# Patient Record
Sex: MALE | Race: WHITE | Employment: FULL TIME | URBAN - METROPOLITAN AREA
[De-identification: names, ages, dates, MRNs, and addresses within clinical notes are randomized per-mention and may not be internally consistent; named-entity substitution may affect disease eponyms.]

---

## 2016-12-13 LAB — CREATININE, EXTERNAL: 1.08

## 2016-12-14 LAB — MICROALBUMIN UR TEST STR-MCNC: 164.5 MG/DL

## 2017-07-13 DIAGNOSIS — E10.65 HYPERGLYCEMIA DUE TO TYPE 1 DIABETES MELLITUS (HCC): Primary | ICD-10-CM

## 2017-07-13 DIAGNOSIS — Z79.4 TYPE 2 DIABETES MELLITUS WITH HYPERGLYCEMIA, WITH LONG-TERM CURRENT USE OF INSULIN (HCC): ICD-10-CM

## 2017-07-13 DIAGNOSIS — E11.65 TYPE 2 DIABETES MELLITUS WITH HYPERGLYCEMIA, WITH LONG-TERM CURRENT USE OF INSULIN (HCC): ICD-10-CM

## 2017-07-13 RX ORDER — INSULIN GLARGINE 100 [IU]/ML
60 INJECTION, SOLUTION SUBCUTANEOUS
Qty: 60 ML | Refills: 3 | Status: ON HOLD | OUTPATIENT
Start: 2017-07-13 | End: 2020-12-17 | Stop reason: SDUPTHER

## 2017-08-09 DIAGNOSIS — E78.2 MIXED HYPERLIPIDEMIA: ICD-10-CM

## 2017-08-09 RX ORDER — LISINOPRIL 10 MG/1
10 TABLET ORAL DAILY
Qty: 90 TAB | Refills: 3 | Status: SHIPPED | OUTPATIENT
Start: 2017-08-09

## 2017-11-08 DIAGNOSIS — E10.65 UNCONTROLLED TYPE 1 DIABETES MELLITUS WITH HYPERGLYCEMIA (HCC): ICD-10-CM

## 2017-11-08 DIAGNOSIS — E78.2 MIXED HYPERLIPIDEMIA: ICD-10-CM

## 2017-11-08 DIAGNOSIS — E10.65 HYPERGLYCEMIA DUE TO TYPE 1 DIABETES MELLITUS (HCC): ICD-10-CM

## 2017-11-08 RX ORDER — FENOFIBRATE 145 MG/1
TABLET, COATED ORAL
Qty: 30 TAB | Refills: 5 | Status: SHIPPED | OUTPATIENT
Start: 2017-11-08 | End: 2017-12-27 | Stop reason: SDUPTHER

## 2017-12-27 DIAGNOSIS — E78.2 MIXED HYPERLIPIDEMIA: ICD-10-CM

## 2017-12-27 DIAGNOSIS — E10.65 UNCONTROLLED TYPE 1 DIABETES MELLITUS WITH HYPERGLYCEMIA (HCC): ICD-10-CM

## 2017-12-27 DIAGNOSIS — E10.65 HYPERGLYCEMIA DUE TO TYPE 1 DIABETES MELLITUS (HCC): ICD-10-CM

## 2017-12-27 RX ORDER — FENOFIBRATE 145 MG/1
TABLET, COATED ORAL
Qty: 90 TAB | Refills: 3 | Status: SHIPPED | OUTPATIENT
Start: 2017-12-27 | End: 2018-07-16 | Stop reason: SDUPTHER

## 2017-12-27 NOTE — TELEPHONE ENCOUNTER
Pharmacy wants doctor to put in 90 day perscription for phenol phibrate  (did not spell it correctly)

## 2018-02-09 DIAGNOSIS — E10.65 HYPERGLYCEMIA DUE TO TYPE 1 DIABETES MELLITUS (HCC): ICD-10-CM

## 2018-02-09 RX ORDER — INSULIN ASPART 100 [IU]/ML
INJECTION, SOLUTION INTRAVENOUS; SUBCUTANEOUS
Qty: 30 ML | Refills: 2 | Status: SHIPPED | OUTPATIENT
Start: 2018-02-09 | End: 2020-12-16

## 2018-06-12 DIAGNOSIS — E10.65 HYPERGLYCEMIA DUE TO TYPE 1 DIABETES MELLITUS (HCC): ICD-10-CM

## 2018-06-12 DIAGNOSIS — E78.2 MIXED HYPERLIPIDEMIA: ICD-10-CM

## 2018-06-12 RX ORDER — BLOOD SUGAR DIAGNOSTIC
STRIP MISCELLANEOUS
Qty: 200 STRIP | Refills: 0 | Status: SHIPPED | OUTPATIENT
Start: 2018-06-12 | End: 2018-07-02 | Stop reason: SDUPTHER

## 2018-07-02 ENCOUNTER — TELEPHONE (OUTPATIENT)
Dept: ENDOCRINOLOGY | Age: 38
End: 2018-07-02

## 2018-07-02 DIAGNOSIS — E10.65 HYPERGLYCEMIA DUE TO TYPE 1 DIABETES MELLITUS (HCC): ICD-10-CM

## 2018-07-02 DIAGNOSIS — E78.2 MIXED HYPERLIPIDEMIA: ICD-10-CM

## 2018-07-02 DIAGNOSIS — E10.65 UNCONTROLLED TYPE 1 DIABETES MELLITUS WITH HYPERGLYCEMIA (HCC): ICD-10-CM

## 2018-07-02 NOTE — TELEPHONE ENCOUNTER
Patient needs a 90day script for one touch strips called to [facility] [phone].  Thank you

## 2018-07-16 DIAGNOSIS — E10.65 UNCONTROLLED TYPE 1 DIABETES MELLITUS WITH HYPERGLYCEMIA (HCC): ICD-10-CM

## 2018-07-16 DIAGNOSIS — E10.65 HYPERGLYCEMIA DUE TO TYPE 1 DIABETES MELLITUS (HCC): ICD-10-CM

## 2018-07-16 DIAGNOSIS — E78.2 MIXED HYPERLIPIDEMIA: ICD-10-CM

## 2018-07-16 RX ORDER — FENOFIBRATE 145 MG/1
TABLET, COATED ORAL
Qty: 90 TAB | Refills: 1 | Status: SHIPPED | OUTPATIENT
Start: 2018-07-16

## 2019-01-18 LAB
CREATININE, EXTERNAL: 1.25
HBA1C MFR BLD HPLC: 12.2 %

## 2019-09-16 LAB
HBA1C MFR BLD HPLC: 10.9 %
PT, EXTERNAL: 0

## 2020-12-16 ENCOUNTER — APPOINTMENT (OUTPATIENT)
Dept: CT IMAGING | Age: 40
End: 2020-12-16
Attending: EMERGENCY MEDICINE
Payer: COMMERCIAL

## 2020-12-16 ENCOUNTER — APPOINTMENT (OUTPATIENT)
Dept: GENERAL RADIOLOGY | Age: 40
End: 2020-12-16
Attending: EMERGENCY MEDICINE
Payer: COMMERCIAL

## 2020-12-16 ENCOUNTER — HOSPITAL ENCOUNTER (OUTPATIENT)
Age: 40
Setting detail: OBSERVATION
Discharge: HOME | End: 2020-12-17
Attending: EMERGENCY MEDICINE | Admitting: INTERNAL MEDICINE
Payer: COMMERCIAL

## 2020-12-16 DIAGNOSIS — R41.82 ALTERED MENTAL STATUS, UNSPECIFIED ALTERED MENTAL STATUS TYPE: Primary | ICD-10-CM

## 2020-12-16 DIAGNOSIS — E16.2 HYPOGLYCEMIA: ICD-10-CM

## 2020-12-16 DIAGNOSIS — E87.6 HYPOKALEMIA: ICD-10-CM

## 2020-12-16 DIAGNOSIS — E10.65 HYPERGLYCEMIA DUE TO TYPE 1 DIABETES MELLITUS (HCC): ICD-10-CM

## 2020-12-16 LAB
ALBUMIN SERPL-MCNC: 4.6 G/DL (ref 3.5–5)
ALBUMIN/GLOB SERPL: 1 {RATIO} (ref 1.1–2.2)
ALP SERPL-CCNC: 100 U/L (ref 45–117)
ALT SERPL-CCNC: 16 U/L (ref 12–78)
AMPHET UR QL SCN: NEGATIVE
ANION GAP SERPL CALC-SCNC: 19 MMOL/L (ref 5–15)
APPEARANCE UR: ABNORMAL
APTT PPP: 22.1 SEC (ref 23–35.7)
AST SERPL W P-5'-P-CCNC: 9 U/L (ref 15–37)
ATRIAL RATE: 117 BPM
ATRIAL RATE: 134 BPM
BACTERIA URNS QL MICRO: NEGATIVE /HPF
BARBITURATES UR QL SCN: NEGATIVE
BASOPHILS # BLD: 0 K/UL (ref 0–0.1)
BASOPHILS NFR BLD: 0 % (ref 0–1)
BENZODIAZ UR QL: NEGATIVE
BILIRUB SERPL-MCNC: 1.1 MG/DL (ref 0.2–1)
BILIRUB UR QL: NEGATIVE
BNP SERPL-MCNC: 25 PG/ML
BUN SERPL-MCNC: 20 MG/DL (ref 6–20)
BUN/CREAT SERPL: 14 (ref 12–20)
CA-I BLD-MCNC: 10.4 MG/DL (ref 8.5–10.1)
CALCULATED P AXIS, ECG09: 40 DEGREES
CALCULATED P AXIS, ECG09: 44 DEGREES
CALCULATED R AXIS, ECG10: 18 DEGREES
CALCULATED R AXIS, ECG10: 20 DEGREES
CALCULATED T AXIS, ECG11: -24 DEGREES
CALCULATED T AXIS, ECG11: 65 DEGREES
CANNABINOIDS UR QL SCN: NEGATIVE
CHLORIDE SERPL-SCNC: 100 MMOL/L (ref 97–108)
CHLORIDE UR-SCNC: 17 MMOL/L
CO2 SERPL-SCNC: 16 MMOL/L (ref 21–32)
COCAINE UR QL SCN: NEGATIVE
COLOR UR: ABNORMAL
CREAT SERPL-MCNC: 1.41 MG/DL (ref 0.7–1.3)
CREAT UR-MCNC: 123 MG/DL
DIAGNOSIS, 93000: NORMAL
DIAGNOSIS, 93000: NORMAL
DIFFERENTIAL METHOD BLD: ABNORMAL
DRUG SCRN COMMENT,DRGCM: NORMAL
EOSINOPHIL # BLD: 0.1 K/UL (ref 0–0.4)
EOSINOPHIL NFR BLD: 1 % (ref 0–7)
ERYTHROCYTE [DISTWIDTH] IN BLOOD BY AUTOMATED COUNT: 12.8 % (ref 11.5–14.5)
ETHANOL SERPL-MCNC: <4 MG/DL
GLOBULIN SER CALC-MCNC: 4.8 G/DL (ref 2–4)
GLUCOSE BLD STRIP.AUTO-MCNC: 114 MG/DL (ref 65–100)
GLUCOSE BLD STRIP.AUTO-MCNC: 146 MG/DL (ref 65–100)
GLUCOSE BLD STRIP.AUTO-MCNC: 169 MG/DL (ref 65–100)
GLUCOSE BLD STRIP.AUTO-MCNC: 189 MG/DL (ref 65–100)
GLUCOSE BLD STRIP.AUTO-MCNC: 302 MG/DL (ref 65–100)
GLUCOSE BLD STRIP.AUTO-MCNC: 360 MG/DL (ref 65–100)
GLUCOSE BLD STRIP.AUTO-MCNC: 57 MG/DL (ref 65–100)
GLUCOSE BLD STRIP.AUTO-MCNC: 64 MG/DL (ref 65–100)
GLUCOSE BLD STRIP.AUTO-MCNC: 74 MG/DL (ref 65–100)
GLUCOSE BLD STRIP.AUTO-MCNC: 76 MG/DL (ref 65–100)
GLUCOSE BLD STRIP.AUTO-MCNC: 90 MG/DL (ref 65–100)
GLUCOSE BLD STRIP.AUTO-MCNC: 92 MG/DL (ref 65–100)
GLUCOSE BLD STRIP.AUTO-MCNC: 97 MG/DL (ref 65–100)
GLUCOSE SERPL-MCNC: 56 MG/DL (ref 65–100)
GLUCOSE UR STRIP.AUTO-MCNC: >300 MG/DL
HCT VFR BLD AUTO: 48.7 % (ref 36.6–50.3)
HGB BLD-MCNC: 17.6 G/DL (ref 12.1–17)
HGB UR QL STRIP: NEGATIVE
HYALINE CASTS URNS QL MICRO: ABNORMAL /LPF (ref 0–5)
IMM GRANULOCYTES # BLD AUTO: 0 K/UL (ref 0–0.04)
IMM GRANULOCYTES NFR BLD AUTO: 0 % (ref 0–0.5)
INR PPP: 0.9 (ref 0.9–1.1)
KETONES UR QL STRIP.AUTO: 80 MG/DL
LACTATE SERPL-SCNC: 2.3 MMOL/L (ref 0.4–2)
LEUKOCYTE ESTERASE UR QL STRIP.AUTO: NEGATIVE
LYMPHOCYTES # BLD: 2.2 K/UL (ref 0.8–3.5)
LYMPHOCYTES NFR BLD: 20 % (ref 12–49)
MCH RBC QN AUTO: 33 PG (ref 26–34)
MCHC RBC AUTO-ENTMCNC: 36.1 G/DL (ref 30–36.5)
MCV RBC AUTO: 91.4 FL (ref 80–99)
METHADONE UR QL: NEGATIVE
MONOCYTES # BLD: 0.4 K/UL (ref 0–1)
MONOCYTES NFR BLD: 4 % (ref 5–13)
MUCOUS THREADS URNS QL MICRO: ABNORMAL /LPF
NEUTS SEG # BLD: 8.1 K/UL (ref 1.8–8)
NEUTS SEG NFR BLD: 75 % (ref 32–75)
NITRITE UR QL STRIP.AUTO: NEGATIVE
OPIATES UR QL: NEGATIVE
P-R INTERVAL, ECG05: 118 MS
P-R INTERVAL, ECG05: 138 MS
PCP UR QL: NEGATIVE
PERFORMED BY, TECHID: ABNORMAL
PERFORMED BY, TECHID: NORMAL
PH UR STRIP: 5 [PH] (ref 5–8)
PLATELET # BLD AUTO: 354 K/UL (ref 150–400)
PMV BLD AUTO: 10.5 FL (ref 8.9–12.9)
POTASSIUM SERPL-SCNC: 2.6 MMOL/L (ref 3.5–5.1)
PROT SERPL-MCNC: 9.4 G/DL (ref 6.4–8.2)
PROT UR STRIP-MCNC: 100 MG/DL
PROTHROMBIN TIME: 12.3 SEC (ref 11.9–14.7)
Q-T INTERVAL, ECG07: 344 MS
Q-T INTERVAL, ECG07: 384 MS
QRS DURATION, ECG06: 96 MS
QRS DURATION, ECG06: 96 MS
QTC CALCULATION (BEZET), ECG08: 513 MS
QTC CALCULATION (BEZET), ECG08: 535 MS
RBC # BLD AUTO: 5.33 M/UL (ref 4.1–5.7)
RBC #/AREA URNS HPF: ABNORMAL /HPF (ref 0–5)
SODIUM SERPL-SCNC: 135 MMOL/L (ref 136–145)
SODIUM UR-SCNC: 23 MMOL/L
SP GR UR REFRACTOMETRY: 1.03 (ref 1–1.03)
THERAPEUTIC RANGE,PTTT: ABNORMAL SEC (ref 68–109)
TROPONIN I SERPL-MCNC: <0.05 NG/ML
UA: UC IF INDICATED,UAUC: ABNORMAL
UROBILINOGEN UR QL STRIP.AUTO: 0.1 EU/DL (ref 0.1–1)
VENTRICULAR RATE, ECG03: 117 BPM
VENTRICULAR RATE, ECG03: 134 BPM
WBC # BLD AUTO: 10.7 K/UL (ref 4.1–11.1)
WBC URNS QL MICRO: ABNORMAL /HPF (ref 0–4)

## 2020-12-16 PROCEDURE — 82436 ASSAY OF URINE CHLORIDE: CPT

## 2020-12-16 PROCEDURE — 96376 TX/PRO/DX INJ SAME DRUG ADON: CPT

## 2020-12-16 PROCEDURE — 74011000636 HC RX REV CODE- 636: Performed by: EMERGENCY MEDICINE

## 2020-12-16 PROCEDURE — 83036 HEMOGLOBIN GLYCOSYLATED A1C: CPT

## 2020-12-16 PROCEDURE — 85025 COMPLETE CBC W/AUTO DIFF WBC: CPT

## 2020-12-16 PROCEDURE — 36415 COLL VENOUS BLD VENIPUNCTURE: CPT

## 2020-12-16 PROCEDURE — 96367 TX/PROPH/DG ADDL SEQ IV INF: CPT

## 2020-12-16 PROCEDURE — 99218 HC RM OBSERVATION: CPT

## 2020-12-16 PROCEDURE — 74011250637 HC RX REV CODE- 250/637: Performed by: INTERNAL MEDICINE

## 2020-12-16 PROCEDURE — 96366 THER/PROPH/DIAG IV INF ADDON: CPT

## 2020-12-16 PROCEDURE — 84300 ASSAY OF URINE SODIUM: CPT

## 2020-12-16 PROCEDURE — 82962 GLUCOSE BLOOD TEST: CPT

## 2020-12-16 PROCEDURE — 71045 X-RAY EXAM CHEST 1 VIEW: CPT

## 2020-12-16 PROCEDURE — 93005 ELECTROCARDIOGRAM TRACING: CPT

## 2020-12-16 PROCEDURE — 99285 EMERGENCY DEPT VISIT HI MDM: CPT

## 2020-12-16 PROCEDURE — 74011000258 HC RX REV CODE- 258: Performed by: EMERGENCY MEDICINE

## 2020-12-16 PROCEDURE — 80053 COMPREHEN METABOLIC PANEL: CPT

## 2020-12-16 PROCEDURE — 83605 ASSAY OF LACTIC ACID: CPT

## 2020-12-16 PROCEDURE — 96365 THER/PROPH/DIAG IV INF INIT: CPT

## 2020-12-16 PROCEDURE — 81001 URINALYSIS AUTO W/SCOPE: CPT

## 2020-12-16 PROCEDURE — 74174 CTA ABD&PLVS W/CONTRAST: CPT

## 2020-12-16 PROCEDURE — 70450 CT HEAD/BRAIN W/O DYE: CPT

## 2020-12-16 PROCEDURE — 80307 DRUG TEST PRSMV CHEM ANLYZR: CPT

## 2020-12-16 PROCEDURE — 84484 ASSAY OF TROPONIN QUANT: CPT

## 2020-12-16 PROCEDURE — 96375 TX/PRO/DX INJ NEW DRUG ADDON: CPT

## 2020-12-16 PROCEDURE — 71275 CT ANGIOGRAPHY CHEST: CPT

## 2020-12-16 PROCEDURE — 74011636637 HC RX REV CODE- 636/637: Performed by: INTERNAL MEDICINE

## 2020-12-16 PROCEDURE — 85730 THROMBOPLASTIN TIME PARTIAL: CPT

## 2020-12-16 PROCEDURE — 70496 CT ANGIOGRAPHY HEAD: CPT

## 2020-12-16 PROCEDURE — 74011636637 HC RX REV CODE- 636/637: Performed by: HOSPITALIST

## 2020-12-16 PROCEDURE — 82570 ASSAY OF URINE CREATININE: CPT

## 2020-12-16 PROCEDURE — 74011250636 HC RX REV CODE- 250/636: Performed by: EMERGENCY MEDICINE

## 2020-12-16 PROCEDURE — 74011000250 HC RX REV CODE- 250: Performed by: EMERGENCY MEDICINE

## 2020-12-16 PROCEDURE — 83880 ASSAY OF NATRIURETIC PEPTIDE: CPT

## 2020-12-16 PROCEDURE — 85610 PROTHROMBIN TIME: CPT

## 2020-12-16 PROCEDURE — 74011250636 HC RX REV CODE- 250/636: Performed by: INTERNAL MEDICINE

## 2020-12-16 RX ORDER — SODIUM CHLORIDE 9 MG/ML
100 INJECTION, SOLUTION INTRAVENOUS CONTINUOUS
Status: DISCONTINUED | OUTPATIENT
Start: 2020-12-16 | End: 2020-12-17 | Stop reason: HOSPADM

## 2020-12-16 RX ORDER — SODIUM CHLORIDE 0.9 % (FLUSH) 0.9 %
5-40 SYRINGE (ML) INJECTION AS NEEDED
Status: DISCONTINUED | OUTPATIENT
Start: 2020-12-16 | End: 2020-12-17 | Stop reason: HOSPADM

## 2020-12-16 RX ORDER — POTASSIUM CHLORIDE 7.45 MG/ML
10 INJECTION INTRAVENOUS ONCE
Status: COMPLETED | OUTPATIENT
Start: 2020-12-16 | End: 2020-12-16

## 2020-12-16 RX ORDER — POLYETHYLENE GLYCOL 3350 17 G/17G
17 POWDER, FOR SOLUTION ORAL DAILY PRN
Status: DISCONTINUED | OUTPATIENT
Start: 2020-12-16 | End: 2020-12-17 | Stop reason: HOSPADM

## 2020-12-16 RX ORDER — DEXTROSE 50 % IN WATER (D50W) INTRAVENOUS SYRINGE
25-50 AS NEEDED
Status: DISCONTINUED | OUTPATIENT
Start: 2020-12-16 | End: 2020-12-17 | Stop reason: HOSPADM

## 2020-12-16 RX ORDER — INSULIN LISPRO 100 [IU]/ML
5 INJECTION, SOLUTION INTRAVENOUS; SUBCUTANEOUS
Status: COMPLETED | OUTPATIENT
Start: 2020-12-16 | End: 2020-12-16

## 2020-12-16 RX ORDER — FENOFIBRATE 145 MG/1
145 TABLET, COATED ORAL DAILY
Status: DISCONTINUED | OUTPATIENT
Start: 2020-12-17 | End: 2020-12-17 | Stop reason: HOSPADM

## 2020-12-16 RX ORDER — DEXTROSE MONOHYDRATE 100 MG/ML
100 INJECTION, SOLUTION INTRAVENOUS CONTINUOUS
Status: DISCONTINUED | OUTPATIENT
Start: 2020-12-16 | End: 2020-12-17 | Stop reason: HOSPADM

## 2020-12-16 RX ORDER — ATORVASTATIN CALCIUM 10 MG/1
10 TABLET, FILM COATED ORAL
Status: DISCONTINUED | OUTPATIENT
Start: 2020-12-16 | End: 2020-12-17 | Stop reason: HOSPADM

## 2020-12-16 RX ORDER — ONDANSETRON 2 MG/ML
4 INJECTION INTRAMUSCULAR; INTRAVENOUS
Status: DISCONTINUED | OUTPATIENT
Start: 2020-12-16 | End: 2020-12-17 | Stop reason: HOSPADM

## 2020-12-16 RX ORDER — ACETAMINOPHEN 650 MG/1
650 SUPPOSITORY RECTAL
Status: DISCONTINUED | OUTPATIENT
Start: 2020-12-16 | End: 2020-12-17 | Stop reason: HOSPADM

## 2020-12-16 RX ORDER — DEXTROSE MONOHYDRATE 50 MG/ML
75 INJECTION, SOLUTION INTRAVENOUS CONTINUOUS
Status: DISCONTINUED | OUTPATIENT
Start: 2020-12-16 | End: 2020-12-17

## 2020-12-16 RX ORDER — SODIUM CHLORIDE 0.9 % (FLUSH) 0.9 %
5-40 SYRINGE (ML) INJECTION EVERY 8 HOURS
Status: DISCONTINUED | OUTPATIENT
Start: 2020-12-16 | End: 2020-12-17 | Stop reason: HOSPADM

## 2020-12-16 RX ORDER — INSULIN LISPRO 100 [IU]/ML
INJECTION, SOLUTION INTRAVENOUS; SUBCUTANEOUS
Status: DISCONTINUED | OUTPATIENT
Start: 2020-12-16 | End: 2020-12-17 | Stop reason: HOSPADM

## 2020-12-16 RX ORDER — ACETAMINOPHEN 325 MG/1
650 TABLET ORAL
Status: DISCONTINUED | OUTPATIENT
Start: 2020-12-16 | End: 2020-12-17 | Stop reason: HOSPADM

## 2020-12-16 RX ORDER — PROMETHAZINE HYDROCHLORIDE 25 MG/1
12.5 TABLET ORAL
Status: DISCONTINUED | OUTPATIENT
Start: 2020-12-16 | End: 2020-12-17 | Stop reason: HOSPADM

## 2020-12-16 RX ORDER — DEXTROSE 50 % IN WATER (D50W) INTRAVENOUS SYRINGE
25 ONCE
Status: COMPLETED | OUTPATIENT
Start: 2020-12-16 | End: 2020-12-16

## 2020-12-16 RX ORDER — POTASSIUM CHLORIDE 7.45 MG/ML
10 INJECTION INTRAVENOUS
Status: DISPENSED | OUTPATIENT
Start: 2020-12-16 | End: 2020-12-16

## 2020-12-16 RX ORDER — NALOXONE HYDROCHLORIDE 1 MG/ML
1 INJECTION INTRAMUSCULAR; INTRAVENOUS; SUBCUTANEOUS
Status: COMPLETED | OUTPATIENT
Start: 2020-12-16 | End: 2020-12-16

## 2020-12-16 RX ORDER — ENOXAPARIN SODIUM 100 MG/ML
40 INJECTION SUBCUTANEOUS DAILY
Status: DISCONTINUED | OUTPATIENT
Start: 2020-12-17 | End: 2020-12-17 | Stop reason: HOSPADM

## 2020-12-16 RX ORDER — CHLORPHENIRAMINE MALEATE 4 MG
TABLET ORAL 2 TIMES DAILY
Status: DISCONTINUED | OUTPATIENT
Start: 2020-12-16 | End: 2020-12-17 | Stop reason: HOSPADM

## 2020-12-16 RX ORDER — MAGNESIUM SULFATE 100 %
4 CRYSTALS MISCELLANEOUS AS NEEDED
Status: DISCONTINUED | OUTPATIENT
Start: 2020-12-16 | End: 2020-12-17 | Stop reason: HOSPADM

## 2020-12-16 RX ADMIN — DEXTROSE MONOHYDRATE 25 G: 25 INJECTION, SOLUTION INTRAVENOUS at 10:59

## 2020-12-16 RX ADMIN — POTASSIUM CHLORIDE 10 MEQ: 7.46 INJECTION, SOLUTION INTRAVENOUS at 22:00

## 2020-12-16 RX ADMIN — SODIUM CHLORIDE 1500 ML: 9 INJECTION, SOLUTION INTRAVENOUS at 16:40

## 2020-12-16 RX ADMIN — DEXTROSE MONOHYDRATE 100 ML/HR: 100 INJECTION, SOLUTION INTRAVENOUS at 12:45

## 2020-12-16 RX ADMIN — NALOXONE HYDROCHLORIDE 1 MG: 1 INJECTION PARENTERAL at 10:41

## 2020-12-16 RX ADMIN — Medication 10 ML: at 14:00

## 2020-12-16 RX ADMIN — INSULIN LISPRO 5 UNITS: 100 INJECTION, SOLUTION INTRAVENOUS; SUBCUTANEOUS at 22:59

## 2020-12-16 RX ADMIN — POTASSIUM CHLORIDE 10 MEQ: 7.46 INJECTION, SOLUTION INTRAVENOUS at 16:40

## 2020-12-16 RX ADMIN — SODIUM CHLORIDE 1000 ML: 9 INJECTION, SOLUTION INTRAVENOUS at 10:42

## 2020-12-16 RX ADMIN — SODIUM CHLORIDE 1000 ML: 9 INJECTION, SOLUTION INTRAVENOUS at 13:52

## 2020-12-16 RX ADMIN — POTASSIUM CHLORIDE 10 MEQ: 7.46 INJECTION, SOLUTION INTRAVENOUS at 20:01

## 2020-12-16 RX ADMIN — SODIUM CHLORIDE 1000 ML: 9 INJECTION, SOLUTION INTRAVENOUS at 12:01

## 2020-12-16 RX ADMIN — POTASSIUM CHLORIDE 10 MEQ: 7.46 INJECTION, SOLUTION INTRAVENOUS at 23:24

## 2020-12-16 RX ADMIN — POTASSIUM CHLORIDE 10 MEQ: 7.46 INJECTION, SOLUTION INTRAVENOUS at 12:02

## 2020-12-16 RX ADMIN — DEXTROSE MONOHYDRATE 75 ML/HR: 50 INJECTION, SOLUTION INTRAVENOUS at 13:52

## 2020-12-16 RX ADMIN — SODIUM CHLORIDE 500 ML: 9 INJECTION, SOLUTION INTRAVENOUS at 19:15

## 2020-12-16 RX ADMIN — POTASSIUM CHLORIDE 10 MEQ: 7.46 INJECTION, SOLUTION INTRAVENOUS at 13:52

## 2020-12-16 RX ADMIN — Medication 10 ML: at 22:01

## 2020-12-16 RX ADMIN — ATORVASTATIN CALCIUM 10 MG: 10 TABLET, FILM COATED ORAL at 22:00

## 2020-12-16 RX ADMIN — INSULIN LISPRO 7 UNITS: 100 INJECTION, SOLUTION INTRAVENOUS; SUBCUTANEOUS at 18:17

## 2020-12-16 RX ADMIN — POTASSIUM CHLORIDE 10 MEQ: 7.46 INJECTION, SOLUTION INTRAVENOUS at 18:19

## 2020-12-16 RX ADMIN — IOPAMIDOL 100 ML: 755 INJECTION, SOLUTION INTRAVENOUS at 11:45

## 2020-12-16 NOTE — ED TRIAGE NOTES
Patient reported \"not feeling well\" prior to becoming unresponsive at approx 1010 this morning. Hx DM, accucheck 97.

## 2020-12-16 NOTE — ED PROVIDER NOTES
EMERGENCY DEPARTMENT HISTORY AND PHYSICAL EXAM        Date: 12/16/2020  Patient Name: Berlinda Homans    History of Presenting Illness     Chief Complaint   Patient presents with    Altered mental status       History Provided By: EMS    HPI: Berlinda Homans, 41 y.o. male with history of diabetes who presents with altered mental status. Patient became unresponsive around 10 AM this morning about 30 minutes prior to arrival.  He is a . He was on duty at the time. He told his coworkers that he was \"not feeling well\" and feeling weak. When they went to the vehicle he was in the passenger seat and in route became unresponsive. Noted to be tachycardic. No medications were given. PCP: Alonso Hopper MD    Current Facility-Administered Medications   Medication Dose Route Frequency Provider Last Rate Last Admin    sodium chloride 0.9 % bolus infusion 1,000 mL  1,000 mL IntraVENous ONCE Filipe Koch C, DO 1,000 mL/hr at 12/16/20 1201 1,000 mL at 12/16/20 1201    potassium chloride 10 mEq in 100 ml IVPB  10 mEq IntraVENous Seward Too Hartman C,  mL/hr at 12/16/20 1202 10 mEq at 12/16/20 1202    dextrose 10% infusion  100 mL/hr IntraVENous CONTINUOUS Apolonia Gallo C,  mL/hr at 12/16/20 1245 100 mL/hr at 12/16/20 1245     Current Outpatient Medications   Medication Sig Dispense Refill    fenofibrate nanocrystallized (TRICOR) 145 mg tablet TAKE 1 TABLET BY MOUTH DAILY 90 Tab 1    glucose blood VI test strips (ONETOUCH VERIO) strip CHECK BLOOD GLUCOSE 4 TIMES DAILY DX CODE E10.65 400 Strip 3    lisinopril (PRINIVIL, ZESTRIL) 10 mg tablet Take 1 Tab by mouth daily. 90 Tab 3    insulin glargine (BASAGLAR KWIKPEN) 100 unit/mL (3 mL) inpn 60 Units by SubCUTAneous route nightly. 60 mL 3    rosuvastatin (CRESTOR) 5 mg tablet Take 1 Tab by mouth daily.  30 Tab 5    Insulin Needles, Disposable, 31 gauge x 5/16\" ndle Use to inject insulin 4x daily DX Code E11.65 200 Pen Needle 11    Lancets (R[place]a [place] ULTRASOFT LANCETS) misc Check 4times a day 400 Each 4    clotrimazole (LOTRIMIN) 1 % topical cream Use as directed 15 g 1    insulin syringe-needle U-100 Dispense microfine 1 mL syringes with 29 gauge needle 100 Syringe 11       Past History     Past Medical History:  Past Medical History:   Diagnosis Date    DM     Hypercholesterolemia     Proteinuria        Past Surgical History:  Past Surgical History:   Procedure Laterality Date    HX ORTHOPAEDIC      knee surgery       Family History:  Family History   Problem Relation Age of Onset    Cancer Mother         lung    Diabetes Father     Hypertension Father        Social History:  Social History     Tobacco Use    Smoking status: Never Smoker    Smokeless tobacco: Current User   Substance Use Topics    Alcohol use: Yes     Alcohol/week: 0.8 standard drinks     Types: 1 Cans of beer per week    Drug use: No       Allergies: Allergies   Allergen Reactions    Cephalexin Rash       Review of Systems   Review of Systems   Unable to perform ROS: Mental status change     Physical Exam   General: Well-nourished. Skin: No rash. Head: Normocephalic. Atraumatic. Eye: No proptosis or conjunctival injections. Respiratory: No apparent respiratory distress. Gastrointestinal: Nondistended. Musculoskeletal: No obvious bony deformities. Neuro: Extremely somnolent but somewhat arousable. He is able to open his eyes and attempts to follow commands with his eyes. Patient does not withdraw from pain in all 4 extremities. Not making any movement with his extremities. No facial droop.     Diagnostic Study Results     Labs -     Recent Results (from the past 24 hour(s))   EKG, 12 LEAD, INITIAL    Collection Time: 12/16/20 10:30 AM   Result Value Ref Range    Ventricular Rate 134 BPM    Atrial Rate 134 BPM    P-R Interval 118 ms    QRS Duration 96 ms    Q-T Interval 344 ms    QTC Calculation (Bezet) 513 ms    Calculated P Axis 40 degrees    Calculated R Axis 20 degrees    Calculated T Axis 65 degrees    Diagnosis       Sinus tachycardia with occasional Premature ventricular complexes  ST & T wave abnormality, consider inferior ischemia  Abnormal ECG  When compared with ECG of 12-JUL-2014 08:42,  Premature ventricular complexes are now Present  ST no longer elevated in Inferior leads  Non-specific change in ST segment in Lateral leads  Nonspecific T wave abnormality, worse in Inferior leads  Nonspecific T wave abnormality now evident in Lateral leads  Confirmed by VALERIE NEWTON, Xena Salinas ([id number]) on 12/16/2020 11:54:15 AM     CBC WITH AUTOMATED DIFF    Collection Time: 12/16/20 10:45 AM   Result Value Ref Range    WBC 10.7 4.1 - 11.1 K/uL    RBC 5.33 4.10 - 5.70 M/uL    HGB 17.6 (H) 12.1 - 17.0 g/dL    HCT 48.7 36.6 - 50.3 %    MCV 91.4 80.0 - 99.0 FL    MCH 33.0 26.0 - 34.0 PG    MCHC 36.1 30.0 - 36.5 g/dL    RDW 12.8 11.5 - 14.5 %    PLATELET 080 790 - 483 K/uL    MPV 10.5 8.9 - 12.9 FL    NEUTROPHILS 75 32 - 75 %    LYMPHOCYTES 20 12 - 49 %    MONOCYTES 4 (L) 5 - 13 %    EOSINOPHILS 1 0 - 7 %    BASOPHILS 0 0 - 1 %    IMMATURE GRANULOCYTES 0 0.0 - 0.5 %    ABS. NEUTROPHILS 8.1 (H) 1.8 - 8.0 K/UL    ABS. LYMPHOCYTES 2.2 0.8 - 3.5 K/UL    ABS. MONOCYTES 0.4 0.0 - 1.0 K/UL    ABS. EOSINOPHILS 0.1 0.0 - 0.4 K/UL    ABS. BASOPHILS 0.0 0.0 - 0.1 K/UL    ABS. IMM.  GRANS. 0.0 0.00 - 0.04 K/UL    DF AUTOMATED     METABOLIC PANEL, COMPREHENSIVE    Collection Time: 12/16/20 10:45 AM   Result Value Ref Range    Sodium 135 (L) 136 - 145 mmol/L    Potassium 2.6 (LL) 3.5 - 5.1 mmol/L    Chloride 100 97 - 108 mmol/L    CO2 16 (L) 21 - 32 mmol/L    Anion gap 19 (H) 5 - 15 mmol/L    Glucose 56 (L) 65 - 100 mg/dL    BUN 20 6 - 20 mg/dL    Creatinine 1.41 (H) 0.70 - 1.30 mg/dL    BUN/Creatinine ratio 14 12 - 20      GFR est AA >60 >60 ml/min/1.73m2    GFR est non-AA 55 (L) >60 ml/min/1.73m2    Calcium 10.4 (H) 8.5 - 10.1 mg/dL    Bilirubin, total 1.1 (H) 0.2 - 1.0 mg/dL    AST (SGOT) 9 (L) 15 - 37 U/L    ALT (SGPT) 16 12 - 78 U/L    Alk. phosphatase 100 45 - 117 U/L    Protein, total 9.4 (H) 6.4 - 8.2 g/dL    Albumin 4.6 3.5 - 5.0 g/dL    Globulin 4.8 (H) 2.0 - 4.0 g/dL    A-G Ratio 1.0 (L) 1.1 - 2.2     TROPONIN I    Collection Time: 12/16/20 10:45 AM   Result Value Ref Range    Troponin-I, Qt. <0.05 <0.05 ng/mL   ETHYL ALCOHOL    Collection Time: 12/16/20 10:45 AM   Result Value Ref Range    ALCOHOL(ETHYL),SERUM <4 <10 mg/dL   DRUG SCREEN, URINE    Collection Time: 12/16/20 10:45 AM   Result Value Ref Range    AMPHETAMINES Negative Negative      BARBITURATES Negative Negative      BENZODIAZEPINES Negative Negative      COCAINE Negative Negative      METHADONE Negative Negative      OPIATES Negative Negative      PCP(PHENCYCLIDINE) Negative Negative      THC (TH-CANNABINOL) Negative Negative      Drug screen comment        This test is a screen for drugs of abuse in a medical setting only (i.e., they are unconfirmed results and as such must not be used for non-medical purposes, e.g.,employment testing, legal testing). Due to its inherent nature, false positive (FP) and false negative (FN) results may be obtained. Therefore, if necessary for medical care, recommend confirmation of positive findings by GC/MS.    URINALYSIS W/ REFLEX CULTURE    Collection Time: 12/16/20 10:45 AM    Specimen: Urine   Result Value Ref Range    Color Yellow/Straw      Appearance Turbid (A) Clear      Specific gravity 1.028 1.003 - 1.030      pH (UA) 5.0 5.0 - 8.0      Protein 100 (A) Negative mg/dL    Glucose >300 (A) Negative mg/dL    Ketone 80 (A) Negative mg/dL    Bilirubin Negative Negative      Blood Negative Negative      Urobilinogen 0.1 0.1 - 1.0 EU/dL    Nitrites Negative Negative      Leukocyte Esterase Negative Negative      WBC 0-4 0 - 4 /hpf    RBC 0-5 0 - 5 /hpf    Bacteria Negative Negative /hpf    UA:UC IF INDICATED Culture not indicated by UA result Culture not indicated by UA result      Mucus Trace (A) Negative /lpf    Hyaline cast 5-10 0 - 5 /lpf   BNP    Collection Time: 12/16/20 10:45 AM   Result Value Ref Range    NT pro-BNP 25 <125 pg/mL   PROTHROMBIN TIME + INR    Collection Time: 12/16/20 10:45 AM   Result Value Ref Range    Prothrombin time 12.3 11.9 - 14.7 sec    INR 0.9 0.9 - 1.1     PTT    Collection Time: 12/16/20 10:45 AM   Result Value Ref Range    aPTT 22.1 (L) 23.0 - 35.7 sec    aPTT, therapeutic range   68 - 109 sec   EKG, 12 LEAD, INITIAL    Collection Time: 12/16/20 10:46 AM   Result Value Ref Range    Ventricular Rate 117 BPM    Atrial Rate 117 BPM    P-R Interval 138 ms    QRS Duration 96 ms    Q-T Interval 384 ms    QTC Calculation (Bezet) 535 ms    Calculated P Axis 44 degrees    Calculated R Axis 18 degrees    Calculated T Axis -24 degrees    Diagnosis       Sinus tachycardia  Possible Left atrial enlargement  T wave abnormality, consider inferior ischemia  Prolonged QT  Abnormal ECG  No previous ECGs available  Confirmed by VALERIE NEWTON, Delio Stovall ([id number]) on 12/16/2020 11:54:21 AM     GLUCOSE, POC    Collection Time: 12/16/20 10:54 AM   Result Value Ref Range    Glucose (POC) 76 65 - 100 mg/dL    Performed by [address], POC    Collection Time: 12/16/20 11:33 AM   Result Value Ref Range    Glucose (POC) 146 (H) 65 - 100 mg/dL    Performed by [address], POC    Collection Time: 12/16/20 11:54 AM   Result Value Ref Range    Glucose (POC) 114 (H) 65 - 100 mg/dL    Performed by [address], POC    Collection Time: 12/16/20 12:10 PM   Result Value Ref Range    Glucose (POC) 92 65 - 100 mg/dL    Performed by [address], POC    Collection Time: 12/16/20 12:20 PM   Result Value Ref Range    Glucose (POC) 90 65 - 100 mg/dL    Performed by [address], POC    Collection Time: 12/16/20 12:30 PM   Result Value Ref Range    Glucose (POC) 74 65 - 100 mg/dL    Performed by [address], POC    Collection Time: 12/16/20 12:41 PM   Result Value Ref Range    Glucose (POC) 64 (L) 65 - 100 mg/dL    Performed by [employer]        Radiologic Studies -   CTA CHEST W OR W WO CONT   Final Result   Impression: No CT evidence for thoracic aortic dissection. CTA ABDOMEN PELV W CONT   Final Result   IMPRESSION: No abdominal aortic aneurysm or dissection. Stool through colon. Colonic diverticulosis. No CT evidence for appendicitis or   diverticulitis. No ascites. CTA HEAD NECK W CONT   Final Result   IMPRESSION: Minor calcified plaque right carotid bifurcation. No measurable degree of narrowing either carotid bifurcation/proximal ICA. (NASCET criteria)      [place] complete. No CT evidence for cerebral aneurysm. Dural venous sinuses are patent. XR CHEST SNGL V   Final Result      CT HEAD WO CONT   Final Result   IMPRESSION: No intracranial hemorrhage. CT Results  (Last 48 hours)               12/16/20 1145  CTA CHEST W OR W WO CONT Final result    Impression:  Impression: No CT evidence for thoracic aortic dissection. Narrative:  CTA chest.       CT abdomen reported separately. Axial images are reviewed along with reformatted sagittal/coronal/MIP images. 100 mL Isovue 370 administered. Study timed to optimal opacification thoracic   aorta. Dose reduction: All CT scans at this facility are performed using dose reduction   optimization techniques as appropriate to a performed exam including the   following-   automated exposure control, adjustments of mA and/or Kv according to patient   size, or use of iterative reconstructive technique. Minor basilar subsegmental atelectasis. No pneumothorax. No pleural effusion. Enhanced images demonstrate mild pulsatile artifact through the ascending   thoracic aorta. Otherwise, the thoracic aortic arch, great vessels traversing   the superior mediastinum, and descending thoracic aorta are normally opacified.    There is normal contrast opacification for central pulmonary vessels. Peripheral   pulmonary artery branch vessels are not well opacified limiting exclusion of   peripheral PE. Cardiac chamber volumes within normal limits. No pericardial   effusion. 12/16/20 1145  CTA ABDOMEN PELV W CONT Final result    Impression:  IMPRESSION: No abdominal aortic aneurysm or dissection. Stool through colon. Colonic diverticulosis. No CT evidence for appendicitis or   diverticulitis. No ascites. Narrative:  CTA abdomen and pelvis with IV contrast.       CTA chest reported separately. Images are reviewed along with reformatted sagittal/coronal images. 100 mL   Isovue 370 administered. Dose reduction: All CT scans at this facility are performed using dose reduction   optimization techniques as appropriate to a performed exam including the   following-   automated exposure control, adjustments of mA and/or Kv according to patient   size, or use of iterative reconstructive technique. There is normal enhancement for the liver. Mild distention gallbladder. Pancreas, bilateral adrenal glands, and bilateral kidneys normally enhance. Normal spleen. Stomach and small bowel loops are decompressed. Appendix appears unremarkable. There is stool and air through colon. Mucosal-based abnormalities may be   obscured. Few scattered diverticula, more so left-sided colon. No CT evidence   for appendicitis or diverticulitis. No ascites. Mild atherosclerotic change normal caliber abdominal aorta and pelvic arteries. No dissection. 12/16/20 1145  CTA HEAD NECK W CONT Final result    Impression:  IMPRESSION: Minor calcified plaque right carotid bifurcation. No measurable degree of narrowing either carotid bifurcation/proximal ICA. (NASCET criteria)       [place] complete. No CT evidence for cerebral aneurysm. Dural venous sinuses are patent.                Narrative:  CTA head       CTA neck CTA chest reported separately. Axial images are reviewed along with reformatted sagittal/coronal/MIP images. 100 mL Isovue 370 administered. Dose reduction: All CT scans at this facility are performed using dose reduction   optimization techniques as appropriate to a performed exam including the   following-   automated exposure control, adjustments of mA and/or Kv according to patient   size, or use of iterative reconstructive technique. Normal contrast opacification thoracic aorta and great vessel origins. Common   carotid arteries through the neck are patent. Vertebral arteries through the neck are patent. For the right carotid bifurcation/proximal right internal carotid artery, there   is minor calcific plaque. No measurable degree of narrowing present. (NASCET   criteria) The more distal right internal carotid artery to the skull base is   patent. For the left carotid bifurcation/proximal left internal carotid artery, there is   no measurable degree of narrowing. (NASCET criteria) The more distal left   internal carotid artery to the skull base is patent. Continuing in the head, distal internal carotid arteries are patent. Distal/intracranial vertebral arteries are patent. The basilar artery is patent. Proximal branch just making up the [place] are patent. No CT evidence   for cerebral aneurysm. Dural venous sinuses normally opacify. 12/16/20 1043  CT HEAD WO CONT Final result    Impression:  IMPRESSION: No intracranial hemorrhage. Narrative:  CT head. Axial images are reviewed along with reformatted sagittal/coronal images. Dose reduction: All CT scans at this facility are performed using dose reduction   optimization techniques as appropriate to a performed exam including the   following-   automated exposure control, adjustments of mA and/or Kv according to patient   size, or use of iterative reconstructive technique. Miguel Angel Chu Bone windows demonstrate normal aeration of the imaged sinuses and mastoid air   cells. Review of intracranial content reveals preserved gray-white differentiation. No   hydrocephalus. No intracranial hemorrhage. CXR Results  (Last 48 hours)               12/16/20 1116  XR CHEST SNGL V Final result    Narrative:  Chest single view. Comparison chest series March 20, 2015. Reduced lung volumes. Central vessel crowding. No gross interstitial or alveolar   pulmonary edema. Cardiac and mediastinal structures unchanged. No pneumothorax   or sizable pleural effusion. Medical Decision Making and ED Course     I reviewed the available vital signs, nursing notes, past medical history, past surgical history, family history, and social history. Vital Signs - Reviewed the patient's vital signs. Patient Vitals for the past 12 hrs:   Temp Pulse Resp BP SpO2   12/16/20 1250  (!) 105 15 124/82 99 %   12/16/20 1210  100 19 136/87 98 %   12/16/20 1206     98 %   12/16/20 1105  (!) 107 13 129/81 96 %   12/16/20 1029 98 °F (36.7 °C) (!) 134 30 (!) 134/94 93 %       EKG interpretation: Taken at 1030. Sinus tachycardia with occasional PVCs at rate of 134 bpm.  Normal [place] interval, QRS duration.  ms. Normal axis. No ST segment abnormalities. Medical Decision Making:   Presented with altered mental status. The differential diagnosis is intracranial hemorrhage, electrolyte abnormality, DKA, hyperglycemia, HHS, hypoglycemia, meningitis, metabolic encephalopathy. Work-up remarkable for hypoglycemia and hypokalemia. He also has elevated anion gap. Patient given amp of dextrose. This improved his glucose however it dropped below 70 so he was started on D10 drip. He was given 2 L of IV normal saline. Mental status initially was very poor and I did consider intubating him for airway protection however on observation he did start to improve.   He was able to follow more commands and  with his hands. I did complete significant work-up including CT angiogram of the head and neck as well as chest to rule out aortic cause or vascular cause. Patient will be admitted to hospitalist for further care. Procedures     Critical Care Note:   10:38 AM  Amount of critical care time: 60 minutes  Impending deterioration: Airway, Respiratory, CNS and Metabolic  Associated risk factors: Metabolic changes and Dehydration  Management: Bedside Assessment and Supervision of Care  Interpretation: Xrays, CT Scan and ECG  Interventions: IV insulin infusion  Case review: Hospitalist, family, friends  Treatment response: Improving, guarded  Performed by: Self  Notes: I have spent critical care time involved in lab review, decision making, bedside attention, and documentation. This time excludes time spent in any separate billed procedures. During this entire length of time I was immediately available to the patient. Cece Dewey DO    Disposition     Admitted        Diagnosis     Clinical impression:   1. Altered mental status, unspecified altered mental status type    2. Hypoglycemia    3. Hypokalemia           Attestation:  Please note that this dictation was completed with Shadow Puppet, the computer voice recognition software. Quite often unanticipated grammatical, syntax, homophones, and other interpretive errors are inadvertently transcribed by the computer software. Please disregard these errors. Please excuse any errors that have escaped final proofreading. Thank you.   Ceec Dewey DO

## 2020-12-16 NOTE — PROGRESS NOTES
Spiritual Care Assessment/Progress Note  [facility]      NAME: Alex Potts      MRN: [id number]  AGE: 41 y.o. SEX: male  Quaker Affiliation: Unknown   Language: English     12/16/2020     Total Time (in minutes): 35     Spiritual Assessment begun in [facility] EMERGENCY DEPT through conversation with:         []Patient        [x] Family    [x] Friend(s)        Reason for Consult: Request by staff     Spiritual beliefs: (Please include comment if needed)     [] Identifies with a naomi tradition:         [] Supported by a naomi community:            [] Claims no spiritual orientation:           [] Seeking spiritual identity:                [] Adheres to an individual form of spirituality:           [x] Not able to assess:                           Identified resources for coping:      [] Prayer                               [] Music                  [] Guided Imagery     [x] Family/friends                 [] Pet visits     [] Devotional reading                         [x] Unknown     [] Other:                                              Interventions offered during this visit: (See comments for more details)    Patient Interventions: Other (comment)(Silent support and prayer)     Family/Friend(s):  Affirmation of emotions/emotional suffering, Bridging, Coping skills reviewed/reinforced, Normalization of emotional/spiritual concerns, Prayer (assurance of)     Plan of Care:     [] Support spiritual and/or cultural needs    [] Support AMD and/or advance care planning process      [] Support grieving process   [] Coordinate Rites and/or Rituals    [] Coordination with community clergy   [] No spiritual needs identified at this time   [] Detailed Plan of Care below (See Comments)  [] Make referral to Music Therapy  [] Make referral to Pet Therapy     [] Make referral to Addiction services  [] Make referral to [facility]  [] Make referral to Spiritual Care Partner  [] No future visits requested [x] Follow up upon further referrals     Comments:  Attempted to visit patient in the ED from staff referral.  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 Patient's co-workers were in a waiting room with the wife. Wife seemed to be processing her emotions tearfully while grieving the uncertainty of her 's condition. A co-worker expressed the difficulty of the situation. I facilitated storytelling and normalized their anxiety under the circumstances. I provided support and assurance of prayer. I provided presence of support and engaged with one of the co-workers in the hallway. I collaborated with staff to assess the situation and needs. I advised of  availability. Chaplains will follow up if further referrals are requested. Chaplain Modesto Rowley M.Div.    can be reached by calling the  at [facility]  [phone]

## 2020-12-16 NOTE — LETTER
NOTIFICATION RETURN TO WORK / SCHOOL 
 
12/17/2020 Mr. Yves Dale [address] [facility] [id number] To Whom It May Concern: 
 
Yves Dale is currently under the care of [facility] [address] [place] [facility]/SURG. He will return to work on: 12/21/2020 If there are questions or concerns please have the patient contact the [place]. Sincerely, Landy Hashimoto MD

## 2020-12-16 NOTE — H&P
GENERAL GENERIC H&P/CONSULT  Presenting complaint: Altered mental status    Subjective: 64-year-old male with past medical history of diabetes, hyperlipidemia presents with altered mental status. Of note patient is responsive to painful stimuli however is nonverbal at this time. Patient's wife was present at bedside stated that patient appeared lethargic as well as weak this morning following which patient was found altered brought to the ED. As per patient's wife patient has been working excessively with a decreased p.o. intake and has been increasingly weak over the past few days. Unable to obtain history or review of systems secondary to patient's clinical status    Past Medical History:   Diagnosis Date    DM     Hypercholesterolemia     Proteinuria       Past Surgical History:   Procedure Laterality Date    HX ORTHOPAEDIC      knee surgery      Prior to Admission medications    Medication Sig Start Date End Date Taking? Authorizing Provider   fenofibrate nanocrystallized (TRICOR) 145 mg tablet TAKE 1 TABLET BY MOUTH DAILY 7/16/18  Yes Dae Hayward MD   glucose blood VI test strips (ONETOUCH VERIO) strip CHECK BLOOD GLUCOSE 4 TIMES DAILY DX CODE E10.65 7/2/18  Yes Maria C Pineda MD   lisinopril (PRINIVIL, ZESTRIL) 10 mg tablet Take 1 Tab by mouth daily. 8/9/17  Yes Maria C Pineda MD   insulin glargine (BASAGLAR KWIKPEN) 100 unit/mL (3 mL) inpn 60 Units by SubCUTAneous route nightly. 7/13/17  Yes Maria C Pineda MD   rosuvastatin (CRESTOR) 5 mg tablet Take 1 Tab by mouth daily.  12/14/16  Yes Maria C Pineda MD   Insulin Needles, Disposable, 31 gauge x 5/16\" ndle Use to inject insulin 4x daily DX Code E11.65 12/14/16  Yes Maria C Pineda MD   Lancets [facility] ULTRASOFT LANCETS) misc Check 4times a day 3/17/16  Yes Maria C Pineda MD   clotrimazole (LOTRIMIN) 1 % topical cream Use as directed 3/17/16  Yes Maria C Pineda MD   insulin syringe-needle U-100 Dispense microfine 1 mL syringes with 29 gauge needle 8/22/13  Yes Nila Lara MD     Allergies   Allergen Reactions    Cephalexin Rash      Social History     Tobacco Use    Smoking status: Never Smoker    Smokeless tobacco: Current User   Substance Use Topics    Alcohol use: Yes     Alcohol/week: 0.8 standard drinks     Types: 1 Cans of beer per week      Family History   Problem Relation Age of Onset    Cancer Mother         lung    Diabetes Father     Hypertension Father       Review of Systems   Unable to perform ROS: Mental status change       Objective:    12/16 0701 - 12/16 1900  In: 1000 [I.V.:1000]  Out: -   No intake/output data recorded. Patient Vitals for the past 8 hrs:   BP Temp Pulse Resp SpO2 Height Weight   12/16/20 1250 124/82  (!) 105 15 99 %     12/16/20 1210 136/87  100 19 98 %     12/16/20 1206     98 %     12/16/20 1105 129/81  (!) 107 13 96 %     12/16/20 1029 (!) 134/94 98 °F (36.7 °C) (!) 134 30 93 % 5' 10\" (1.778 m) 83.9 kg (185 lb)     Physical Exam  Constitutional:       General: He is not in acute distress. Appearance: Normal appearance. He is normal weight. He is ill-appearing. He is not toxic-appearing or diaphoretic. HENT:      Head: Normocephalic and atraumatic. Nose: Nose normal. No congestion or rhinorrhea. Mouth/Throat:      Mouth: Mucous membranes are dry. Pharynx: Oropharynx is clear. No oropharyngeal exudate or posterior oropharyngeal erythema. Eyes:      General: No scleral icterus. Right eye: No discharge. Left eye: No discharge. Extraocular Movements: Extraocular movements intact. Conjunctiva/sclera: Conjunctivae normal.      Pupils: Pupils are equal, round, and reactive to light. Neck:      Musculoskeletal: Normal range of motion and neck supple. No neck rigidity or muscular tenderness. Vascular: No carotid bruit. Cardiovascular:      Rate and Rhythm: Normal rate and regular rhythm.       Pulses: Normal pulses. Heart sounds: Normal heart sounds. No murmur. No friction rub. No gallop. Pulmonary:      Effort: Pulmonary effort is normal. No respiratory distress. Breath sounds: Normal breath sounds. No stridor. No wheezing, rhonchi or rales. Chest:      Chest wall: No tenderness. Abdominal:      General: Abdomen is flat. Bowel sounds are normal. There is no distension. Palpations: Abdomen is soft. There is no mass. Tenderness: There is no abdominal tenderness. There is no right CVA tenderness, left CVA tenderness, guarding or rebound. Hernia: No hernia is present. Musculoskeletal: Normal range of motion. General: No swelling, tenderness, deformity or signs of injury. Right lower leg: No edema. Left lower leg: No edema. Lymphadenopathy:      Cervical: No cervical adenopathy. Skin:     General: Skin is warm. Capillary Refill: Capillary refill takes less than 2 seconds. Coloration: Skin is not jaundiced or pale. Findings: No bruising, erythema, lesion or rash. Neurological:      General: No focal deficit present. Mental Status: He is disoriented.           Labs:    Recent Results (from the past 24 hour(s))   EKG, 12 LEAD, INITIAL    Collection Time: 12/16/20 10:30 AM   Result Value Ref Range    Ventricular Rate 134 BPM    Atrial Rate 134 BPM    P-R Interval 118 ms    QRS Duration 96 ms    Q-T Interval 344 ms    QTC Calculation (Bezet) 513 ms    Calculated P Axis 40 degrees    Calculated R Axis 20 degrees    Calculated T Axis 65 degrees    Diagnosis       Sinus tachycardia with occasional Premature ventricular complexes  ST & T wave abnormality, consider inferior ischemia  Abnormal ECG  When compared with ECG of 12-JUL-2014 08:42,  Premature ventricular complexes are now Present  ST no longer elevated in Inferior leads  Non-specific change in ST segment in Lateral leads  Nonspecific T wave abnormality, worse in Inferior leads  Nonspecific T wave abnormality now evident in Lateral leads  Confirmed by Bhavna PADILLA MD ([id number]) on 12/16/2020 11:54:15 AM     CBC WITH AUTOMATED DIFF    Collection Time: 12/16/20 10:45 AM   Result Value Ref Range    WBC 10.7 4.1 - 11.1 K/uL    RBC 5.33 4.10 - 5.70 M/uL    HGB 17.6 (H) 12.1 - 17.0 g/dL    HCT 48.7 36.6 - 50.3 %    MCV 91.4 80.0 - 99.0 FL    MCH 33.0 26.0 - 34.0 PG    MCHC 36.1 30.0 - 36.5 g/dL    RDW 12.8 11.5 - 14.5 %    PLATELET 696 897 - 506 K/uL    MPV 10.5 8.9 - 12.9 FL    NEUTROPHILS 75 32 - 75 %    LYMPHOCYTES 20 12 - 49 %    MONOCYTES 4 (L) 5 - 13 %    EOSINOPHILS 1 0 - 7 %    BASOPHILS 0 0 - 1 %    IMMATURE GRANULOCYTES 0 0.0 - 0.5 %    ABS. NEUTROPHILS 8.1 (H) 1.8 - 8.0 K/UL    ABS. LYMPHOCYTES 2.2 0.8 - 3.5 K/UL    ABS. MONOCYTES 0.4 0.0 - 1.0 K/UL    ABS. EOSINOPHILS 0.1 0.0 - 0.4 K/UL    ABS. BASOPHILS 0.0 0.0 - 0.1 K/UL    ABS. IMM. GRANS. 0.0 0.00 - 0.04 K/UL    DF AUTOMATED     METABOLIC PANEL, COMPREHENSIVE    Collection Time: 12/16/20 10:45 AM   Result Value Ref Range    Sodium 135 (L) 136 - 145 mmol/L    Potassium 2.6 (LL) 3.5 - 5.1 mmol/L    Chloride 100 97 - 108 mmol/L    CO2 16 (L) 21 - 32 mmol/L    Anion gap 19 (H) 5 - 15 mmol/L    Glucose 56 (L) 65 - 100 mg/dL    BUN 20 6 - 20 mg/dL    Creatinine 1.41 (H) 0.70 - 1.30 mg/dL    BUN/Creatinine ratio 14 12 - 20      GFR est AA >60 >60 ml/min/1.73m2    GFR est non-AA 55 (L) >60 ml/min/1.73m2    Calcium 10.4 (H) 8.5 - 10.1 mg/dL    Bilirubin, total 1.1 (H) 0.2 - 1.0 mg/dL    AST (SGOT) 9 (L) 15 - 37 U/L    ALT (SGPT) 16 12 - 78 U/L    Alk.  phosphatase 100 45 - 117 U/L    Protein, total 9.4 (H) 6.4 - 8.2 g/dL    Albumin 4.6 3.5 - 5.0 g/dL    Globulin 4.8 (H) 2.0 - 4.0 g/dL    A-G Ratio 1.0 (L) 1.1 - 2.2     TROPONIN I    Collection Time: 12/16/20 10:45 AM   Result Value Ref Range    Troponin-I, Qt. <0.05 <0.05 ng/mL   ETHYL ALCOHOL    Collection Time: 12/16/20 10:45 AM   Result Value Ref Range    ALCOHOL(ETHYL),SERUM <4 <10 mg/dL   DRUG SCREEN, URINE Collection Time: 12/16/20 10:45 AM   Result Value Ref Range    AMPHETAMINES Negative Negative      BARBITURATES Negative Negative      BENZODIAZEPINES Negative Negative      COCAINE Negative Negative      METHADONE Negative Negative      OPIATES Negative Negative      PCP(PHENCYCLIDINE) Negative Negative      THC (TH-CANNABINOL) Negative Negative      Drug screen comment        This test is a screen for drugs of abuse in a medical setting only (i.e., they are unconfirmed results and as such must not be used for non-medical purposes, e.g.,employment testing, legal testing). Due to its inherent nature, false positive (FP) and false negative (FN) results may be obtained. Therefore, if necessary for medical care, recommend confirmation of positive findings by GC/MS.    URINALYSIS W/ REFLEX CULTURE    Collection Time: 12/16/20 10:45 AM    Specimen: Urine   Result Value Ref Range    Color Yellow/Straw      Appearance Turbid (A) Clear      Specific gravity 1.028 1.003 - 1.030      pH (UA) 5.0 5.0 - 8.0      Protein 100 (A) Negative mg/dL    Glucose >300 (A) Negative mg/dL    Ketone 80 (A) Negative mg/dL    Bilirubin Negative Negative      Blood Negative Negative      Urobilinogen 0.1 0.1 - 1.0 EU/dL    Nitrites Negative Negative      Leukocyte Esterase Negative Negative      WBC 0-4 0 - 4 /hpf    RBC 0-5 0 - 5 /hpf    Bacteria Negative Negative /hpf    UA:UC IF INDICATED Culture not indicated by UA result Culture not indicated by UA result      Mucus Trace (A) Negative /lpf    Hyaline cast 5-10 0 - 5 /lpf   BNP    Collection Time: 12/16/20 10:45 AM   Result Value Ref Range    NT pro-BNP 25 <125 pg/mL   PROTHROMBIN TIME + INR    Collection Time: 12/16/20 10:45 AM   Result Value Ref Range    Prothrombin time 12.3 11.9 - 14.7 sec    INR 0.9 0.9 - 1.1     PTT    Collection Time: 12/16/20 10:45 AM   Result Value Ref Range    aPTT 22.1 (L) 23.0 - 35.7 sec    aPTT, therapeutic range   68 - 109 sec   EKG, 12 LEAD, INITIAL Collection Time: 12/16/20 10:46 AM   Result Value Ref Range    Ventricular Rate 117 BPM    Atrial Rate 117 BPM    P-R Interval 138 ms    QRS Duration 96 ms    Q-T Interval 384 ms    QTC Calculation (Bezet) 535 ms    Calculated P Axis 44 degrees    Calculated R Axis 18 degrees    Calculated T Axis -24 degrees    Diagnosis       Sinus tachycardia  Possible Left atrial enlargement  T wave abnormality, consider inferior ischemia  Prolonged QT  Abnormal ECG  No previous ECGs available  Confirmed by Jairo PADILLA MD ([id number]) on 12/16/2020 11:54:21 AM     GLUCOSE, POC    Collection Time: 12/16/20 10:54 AM   Result Value Ref Range    Glucose (POC) 76 65 - 100 mg/dL    Performed by [address], POC    Collection Time: 12/16/20 11:33 AM   Result Value Ref Range    Glucose (POC) 146 (H) 65 - 100 mg/dL    Performed by [address], POC    Collection Time: 12/16/20 11:54 AM   Result Value Ref Range    Glucose (POC) 114 (H) 65 - 100 mg/dL    Performed by [address], POC    Collection Time: 12/16/20 12:10 PM   Result Value Ref Range    Glucose (POC) 92 65 - 100 mg/dL    Performed by [address], POC    Collection Time: 12/16/20 12:20 PM   Result Value Ref Range    Glucose (POC) 90 65 - 100 mg/dL    Performed by [address], POC    Collection Time: 12/16/20 12:30 PM   Result Value Ref Range    Glucose (POC) 74 65 - 100 mg/dL    Performed by [address], POC    Collection Time: 12/16/20 12:41 PM   Result Value Ref Range    Glucose (POC) 64 (L) 65 - 100 mg/dL    Performed by [address], POC    Collection Time: 12/16/20 12:55 PM   Result Value Ref Range    Glucose (POC) 57 (L) 65 - 100 mg/dL    Performed by Caio Carrera        ECG: unchanged from previous tracings   Chest X-Ray: normal    CT head:IMPRESSION  IMPRESSION: No intracranial hemorrhage.   CTA Abdomen/pelvis:IMPRESSION  IMPRESSION: No abdominal aortic aneurysm or dissection.     Stool through colon. Colonic diverticulosis. No CT evidence for appendicitis or  diverticulitis. No ascites.     CT chest:IMPRESSION  Impression: No CT evidence for thoracic aortic dissection. Assessment:  Active Problems:    Altered mental status (12/16/2020)        Plan: Altered mental statuspatient presents with above-mentioned symptomatology based on patient's clinical presentation patient was found to have altered mental status likely multifactorial in nature, of note patient's mental status is improving, likely secondary to severe dehydration as well as symptomatic hypoglycemia  Management as documented below  Patient CT head is negative for any acute intracranial pathologies  Continue to monitor    Hypoglycemialikely iatrogenic in the setting of patient taking insulin as well as decreased p.o. intake  Discontinue insulin  Hypoglycemic protocol  Continue D5 drip  Continue to monitor    Severe dehydrationpatient status post 1.5 L normal saline bolus  We will recommend distal 2 L bolus  Maintenance IV fluids  Continue to monitor    Hypokalemiareplete potassium and recheck potassium    Acute kidney injurypatient found to have an elevated serum creatinine, likely prerenal in etiology with differentials including renal versus post renal  Recommend IV fluids as above  Obtain urinary electrolytes to calculate fractional excretion of sodium  Continue to trend serum creatinine    Hyperlipidemiacontinue statin    ProphylaxisLovenox  FENdiabetic diet, maintenance IV fluids, replete potassium and magnesium  Full code, patient surrogate decision-maker is his wife, admitted for observation    Signed:   Landy Hashimoto, MD 12/16/2020

## 2020-12-17 VITALS
BODY MASS INDEX: 26.48 KG/M2 | HEART RATE: 97 BPM | WEIGHT: 185 LBS | RESPIRATION RATE: 18 BRPM | SYSTOLIC BLOOD PRESSURE: 147 MMHG | OXYGEN SATURATION: 100 % | TEMPERATURE: 98.1 F | DIASTOLIC BLOOD PRESSURE: 86 MMHG | HEIGHT: 70 IN

## 2020-12-17 LAB
ANION GAP SERPL CALC-SCNC: 12 MMOL/L (ref 5–15)
BUN SERPL-MCNC: 12 MG/DL (ref 6–20)
BUN/CREAT SERPL: 18 (ref 12–20)
CA-I BLD-MCNC: 8.4 MG/DL (ref 8.5–10.1)
CHLORIDE SERPL-SCNC: 103 MMOL/L (ref 97–108)
CO2 SERPL-SCNC: 20 MMOL/L (ref 21–32)
CREAT SERPL-MCNC: 0.67 MG/DL (ref 0.7–1.3)
EST. AVERAGE GLUCOSE BLD GHB EST-MCNC: 312 MG/DL
GLUCOSE BLD STRIP.AUTO-MCNC: 312 MG/DL (ref 65–100)
GLUCOSE BLD STRIP.AUTO-MCNC: 340 MG/DL (ref 65–100)
GLUCOSE BLD STRIP.AUTO-MCNC: 354 MG/DL (ref 65–100)
GLUCOSE BLD STRIP.AUTO-MCNC: 356 MG/DL (ref 65–100)
GLUCOSE BLD STRIP.AUTO-MCNC: 356 MG/DL (ref 65–100)
GLUCOSE SERPL-MCNC: 293 MG/DL (ref 65–100)
HBA1C MFR BLD: 12.5 % (ref 4–5.6)
PERFORMED BY, TECHID: ABNORMAL
POTASSIUM SERPL-SCNC: 3.9 MMOL/L (ref 3.5–5.1)
SODIUM SERPL-SCNC: 135 MMOL/L (ref 136–145)

## 2020-12-17 PROCEDURE — 82962 GLUCOSE BLOOD TEST: CPT

## 2020-12-17 PROCEDURE — 74011250636 HC RX REV CODE- 250/636: Performed by: INTERNAL MEDICINE

## 2020-12-17 PROCEDURE — 74011250637 HC RX REV CODE- 250/637: Performed by: INTERNAL MEDICINE

## 2020-12-17 PROCEDURE — 80048 BASIC METABOLIC PNL TOTAL CA: CPT

## 2020-12-17 PROCEDURE — 74011636637 HC RX REV CODE- 636/637: Performed by: INTERNAL MEDICINE

## 2020-12-17 PROCEDURE — 96376 TX/PRO/DX INJ SAME DRUG ADON: CPT

## 2020-12-17 PROCEDURE — 99218 HC RM OBSERVATION: CPT

## 2020-12-17 PROCEDURE — 36415 COLL VENOUS BLD VENIPUNCTURE: CPT

## 2020-12-17 RX ORDER — INSULIN GLARGINE 100 [IU]/ML
40 INJECTION, SOLUTION SUBCUTANEOUS
Qty: 60 ML | Refills: 3 | Status: SHIPPED | OUTPATIENT
Start: 2020-12-17

## 2020-12-17 RX ORDER — INSULIN LISPRO 100 [IU]/ML
INJECTION, SOLUTION INTRAVENOUS; SUBCUTANEOUS
Qty: 1 VIAL | Refills: 0 | Status: SHIPPED | OUTPATIENT
Start: 2020-12-17

## 2020-12-17 RX ORDER — POTASSIUM CHLORIDE 7.45 MG/ML
10 INJECTION INTRAVENOUS
Status: COMPLETED | OUTPATIENT
Start: 2020-12-17 | End: 2020-12-17

## 2020-12-17 RX ADMIN — Medication 10 ML: at 06:00

## 2020-12-17 RX ADMIN — Medication 10 ML: at 14:00

## 2020-12-17 RX ADMIN — INSULIN LISPRO 7 UNITS: 100 INJECTION, SOLUTION INTRAVENOUS; SUBCUTANEOUS at 09:45

## 2020-12-17 RX ADMIN — POTASSIUM CHLORIDE 10 MEQ: 7.46 INJECTION, SOLUTION INTRAVENOUS at 02:00

## 2020-12-17 RX ADMIN — FENOFIBRATE 145 MG: 145 TABLET, FILM COATED ORAL at 09:45

## 2020-12-17 NOTE — DISCHARGE SUMMARY
Hospitalist Discharge Summary     Patient ID:  Ruthie Duenas  [id number]  19 y.o.  1980 12/16/2020    PCP on record: UNKNOWN    Admit date: 12/16/2020  Discharge date and time: 12/17/2020    DISCHARGE DIAGNOSIS:    Dehydration/Altered mental status/Hypoglycemia    CONSULTATIONS:  IP CONSULT TO NEPHROLOGY    Excerpted HPI from H&P of Molina Mcdaniel MD:  52-year-old male with past medical history of diabetes, hyperlipidemia presents with altered mental status. Of note patient is responsive to painful stimuli however is nonverbal at this time. Patient's wife was present at bedside stated that patient appeared lethargic as well as weak this morning following which patient was found altered brought to the ED. As per patient's wife patient has been working excessively with a decreased p.o. intake and has been increasingly weak over the past few days. Unable to obtain history or review of systems secondary to patient's clinical status    ______________________________________________________________________  DISCHARGE SUMMARY/HOSPITAL COURSE:  for full details see H&P, daily progress notes, labs, consult notes.    He was subsequently admitted to [facility] with altered mental status as well as severe dehydration as well as symptomatic hypoglycemia, patient received aggressive fluid resuscitation, patient was placed on a dextrose drip, following which patient's clinical status improved, patient returned back to his baseline mental status, patient's symptomatology was therefore secondary to severe dehydration as well as symptomatic hypoglycemia, patient had significant hypokalemia which was aggressively repleted following which patient was deemed stable for discharge with close outpatient follow-up, patient was discharged on a reduced insulin dosage and for readjustment with sliding scale according to his fingersticks at home, this plan was discussed with the patient as well as his wife who are agreeable with the current plan.          _______________________________________________________________________  Patient seen and examined by me on discharge day. Pertinent Findings:  Gen:    Not in distress  Chest: Clear lungs  CVS:   Regular rhythm, s1/s2 no m/r/g  No edema  Abd:  Soft, not distended, not tender  Neuro:  Alert, Oriented x 4  _______________________________________________________________________  DISCHARGE MEDICATIONS:   Current Discharge Medication List      START taking these medications    Details   insulin lispro (HUMALOG) 100 unit/mL injection INITIATE CORRECTIVE INSULIN PROTOCOL (TABATHA):  RX TABATHA Normal Sensitivity (Average weight)    AC (before meals), Q6H, and Q4H CORRECTIONAL SCALE only For Blood Sugar (mg/dl) of :             140-199=2 units            200-249=3 units  250-299=5 units  300-349=7 units  350 or greater = Call MD  Give in addition to basal medications. Do Not Hold for NPO    BEDTIME CORRECTIONAL sliding scale when scheduled:  200-249=2 units  250-299=3 units   300-349=4 units  350 or greater = Call MD  Give in addition to basal medications. Do Not Hold for NPO Fast Acting - Administer Immediately - or within 15 minutes of start of meal, if mealtime coverage. Qty: 1 Vial, Refills: 0         CONTINUE these medications which have CHANGED    Details   insulin glargine (Basaglar KwikPen U-100 Insulin) 100 unit/mL (3 mL) inpn 40 Units by SubCUTAneous route nightly. Qty: 60 mL, Refills: 3    Comments: Follow up appt required for further refills  Associated Diagnoses: Hyperglycemia due to type 1 diabetes mellitus ([facility] [address]         CONTINUE these medications which have NOT CHANGED    Details   glucose blood VI test strips (ONETOUCH VERIO) strip CHECK BLOOD GLUCOSE 4 TIMES DAILY DX CODE E10.65  Qty: 400 Strip, Refills: 3    Associated Diagnoses: Mixed hyperlipidemia;  Hyperglycemia due to type 1 diabetes mellitus (HCC)      lisinopril (PRINIVIL, ZESTRIL) 10 mg tablet Take 1 Tab by mouth daily. Qty: 90 Tab, Refills: 3    Associated Diagnoses: Mixed hyperlipidemia      rosuvastatin (CRESTOR) 5 mg tablet Take 1 Tab by mouth daily. Qty: 30 Tab, Refills: 5    Associated Diagnoses: Mixed hyperlipidemia      Insulin Needles, Disposable, 31 gauge x 5/16\" ndle Use to inject insulin 4x daily DX Code E11.65  Qty: 200 Pen Needle, Refills: 11    Associated Diagnoses: Uncontrolled type 1 diabetes mellitus with hyperglycemia ([facility] [address] Hyperglycemia due to type 1 diabetes mellitus (HCC)      Lancets (ONETOUCH ULTRASOFT LANCETS) misc Check 4times a day  Qty: 400 Each, Refills: 4    Associated Diagnoses: Type I diabetes mellitus, uncontrolled ([facility])      clotrimazole (LOTRIMIN) 1 % topical cream Use as directed  Qty: 15 g, Refills: 1    Associated Diagnoses: Tinea      insulin syringe-needle U-100 Dispense microfine 1 mL syringes with 29 gauge needle  Qty: 100 Syringe, Refills: 11    Associated Diagnoses: Type I (juvenile type) diabetes mellitus without mention of complication, uncontrolled; Mixed hyperlipidemia; Proteinuria; Noncompliance; Type I (juvenile type) diabetes mellitus with renal manifestations, uncontrolled(250.43) ([facility])      fenofibrate nanocrystallized (TRICOR) 145 mg tablet TAKE 1 TABLET BY MOUTH DAILY  Qty: 90 Tab, Refills: 1    Associated Diagnoses: Mixed hyperlipidemia; Uncontrolled type 1 diabetes mellitus with hyperglycemia ([facility] [address] Hyperglycemia due to type 1 diabetes mellitus [facility])               Patient Follow Up Instructions: Activity: Activity as tolerated  Diet: Diabetic Diet  Wound Care: None needed    Follow-up with As per PCP in 2 weeks.   Follow-up tests/labs As per above physician  Follow-up Information     Follow up With Specialties Details Why Contact Info    UNKNOWN            ________________________________________________________________    Risk of deterioration: Low    Condition at Discharge: Stable  __________________________________________________________________    Disposition  Home with family, no needs    ____________________________________________________________________    Code Status: Full Code  ___________________________________________________________________      Total time in minutes spent coordinating this discharge (includes going over instructions, follow-up, prescriptions, and preparing report for sign off to her PCP) :  40 minutes    Signed:   Shandra Santiago MD

## 2020-12-17 NOTE — CONSULTS
Renal Daily Progress Note    Admit Date: 12/16/2020      Subjective:     47-year-old  gentleman with history of diabetes mellitus who was admitted here with altered mental status. He had a creatinine of 1.4 mg yesterday and I been asked to see him regarding this. He was hypoglycemic on admission. He tells me that sugars have not been well controlled at home. He has not seen his primary care and does not remember when his last hemoglobin A1c was drawn. His sugars have been in the 250s at home apparently. He admits to nocturia. He tells me that he has had proteinuria. He has also been taking ibuprofen 800 mg a day for left hip pain. He has cramps intermittently. He denies orthopnea or PND. He denies chest pain or palpitation. He complains of numbness in his hands. He denies any changes in his vision.     Current Facility-Administered Medications   Medication Dose Route Frequency    dextrose 10% infusion  100 mL/hr IntraVENous CONTINUOUS    0.9% sodium chloride infusion  100 mL/hr IntraVENous CONTINUOUS    clotrimazole (LOTRIMIN) 1 % cream   Topical BID    fenofibrate nanocrystallized (TRICOR) tablet 145 mg  145 mg Oral DAILY    atorvastatin (LIPITOR) tablet 10 mg  10 mg Oral QHS    glucose chewable tablet 16 g  4 Tab Oral PRN    dextrose (D50W) injection syrg 12.5-25 g  25-50 mL IntraVENous PRN    glucagon (GLUCAGEN) injection 1 mg  1 mg IntraMUSCular PRN    insulin lispro (HUMALOG) injection   SubCUTAneous AC&HS    sodium chloride (NS) flush 5-40 mL  5-40 mL IntraVENous Q8H    sodium chloride (NS) flush 5-40 mL  5-40 mL IntraVENous PRN    acetaminophen (TYLENOL) tablet 650 mg  650 mg Oral Q6H PRN    Or    acetaminophen (TYLENOL) suppository 650 mg  650 mg Rectal Q6H PRN    polyethylene glycol (MIRALAX) packet 17 g  17 g Oral DAILY PRN    promethazine (PHENERGAN) tablet 12.5 mg  12.5 mg Oral Q6H PRN    Or    ondansetron (ZOFRAN) injection 4 mg  4 mg IntraVENous Q6H PRN    enoxaparin (LOVENOX) injection 40 mg  40 mg SubCUTAneous DAILY        Review of Systems    Review of Systems   Constitutional: Negative for chills and fever. Eyes: Negative for blurred vision. Respiratory: Negative for cough, sputum production and shortness of breath. Cardiovascular: Negative for chest pain, palpitations and leg swelling. Gastrointestinal: Negative for abdominal pain, heartburn, nausea and vomiting. Genitourinary: Negative for dysuria. Skin: Negative for itching and rash. Neurological: Negative for dizziness and headaches. Objective:     Patient Vitals for the past 8 hrs:   BP Temp Pulse Resp SpO2   12/17/20 0929 (!) 104/50 98.9 °F (37.2 °C) 93 20 100 %   12/17/20 0905 124/79 98.1 °F (36.7 °C) (!) 104 18 98 %   12/17/20 0800   (!) 106       No intake/output data recorded. 12/15 1901 - 12/17 0700  In: 3111.7 [I.V.:3111.7]  Out: 1400 [Urine:1400]    Physical Exam:   Physical Exam   Constitutional: He appears well-developed. Neck: Neck supple. No JVD present. Cardiovascular: Normal rate and regular rhythm. Exam reveals no friction rub. Pulmonary/Chest: Effort normal. No respiratory distress. He has no rales. Abdominal: Soft. Bowel sounds are normal. There is no abdominal tenderness. Musculoskeletal:         General: No edema. Neurological: He is alert. Skin: Skin is warm. CTA CHEST W OR W WO CONT   Final Result   Impression: No CT evidence for thoracic aortic dissection. CTA ABDOMEN PELV W CONT   Final Result   IMPRESSION: No abdominal aortic aneurysm or dissection. Stool through colon. Colonic diverticulosis. No CT evidence for appendicitis or   diverticulitis. No ascites. CTA HEAD NECK W CONT   Final Result   IMPRESSION: Minor calcified plaque right carotid bifurcation. No measurable degree of narrowing either carotid bifurcation/proximal ICA. (NASCET criteria)      [place] complete.  No CT evidence for cerebral aneurysm. Dural venous sinuses are patent. XR CHEST SNGL V   Final Result      CT HEAD WO CONT   Final Result   IMPRESSION: No intracranial hemorrhage. Data Review   Recent Labs     12/16/20  1045   WBC 10.7   HGB 17.6*   HCT 48.7        Recent Labs     12/16/20  1045   *   K 2.6*      CO2 16*   GLU 56*   BUN 20   CREA 1.41*   CA 10.4*   ALB 4.6   ALT 16   INR 0.9     No components found for: GLPOC  No results for input(s): PH, PCO2, PO2, HCO3, FIO2 in the last 72 hours. Recent Labs     12/16/20  1045   INR 0.9         Assessment:           Active Problems:    Altered mental status (12/16/2020)    UBALDO secondary to volume contraction. He was hydrated with IV normal saline and intravenous dextrose. History of proteinuria caused by diabetic nephropathy and use of NSAIDs. Hyperlipidemia  Hypokalemia repleted  Metabolic acidosis due to UBALDO. Plan:   Needs a urine PCR. I have spoken to the patient and his wife regarding follow-up with his primary care physician to check urine PCR. Advised to stop NSAIDs. Need to keep sugars around 120 or lower  We will be happy to see him in the office as an outpatient.

## 2020-12-17 NOTE — ROUTINE PROCESS
[facility] BEDSIDE_[facility]_shift change report given to Lyndsay Russ RN (oncoming nurse) by Carolyn Heck RN (offgoing nurse). Report included the following information from the SBAR and assessment

## 2020-12-17 NOTE — PROGRESS NOTES
Patient discharged home self care. All instructions for post discharge reviewed with patient at bedside. IV catheters intact upon removal. Patient ambulated downstairs and left facility via private vehicle accompanied by spouse.

## 2020-12-17 NOTE — ED NOTES
Bedside shift change report given to April, RN (oncoming nurse) by GREGORIO Aguilar (offgoing nurse). Report included the following information SBAR, Kardex, Intake/Output, MAR and Recent Results.

## 2020-12-17 NOTE — ROUTINE PROCESS
RE:  Blood sugar. Informed Dr Masters Res of patients blood sugar of 312. Orders given to discontinue IV fluids of D5. No other orders received

## 2022-03-18 PROBLEM — R41.82 ALTERED MENTAL STATUS: Status: ACTIVE | Noted: 2020-12-16

## 2023-05-15 RX ORDER — INSULIN GLARGINE 100 [IU]/ML
40 INJECTION, SOLUTION SUBCUTANEOUS
COMMUNITY
Start: 2020-12-17

## 2023-05-15 RX ORDER — CLOTRIMAZOLE 1 %
CREAM (GRAM) TOPICAL
COMMUNITY
Start: 2016-03-17

## 2023-05-15 RX ORDER — INSULIN LISPRO 100 [IU]/ML
INJECTION, SOLUTION INTRAVENOUS; SUBCUTANEOUS
COMMUNITY
Start: 2020-12-17

## 2023-05-15 RX ORDER — ROSUVASTATIN CALCIUM 5 MG/1
5 TABLET, COATED ORAL DAILY
COMMUNITY
Start: 2016-12-14

## 2023-05-15 RX ORDER — LISINOPRIL 10 MG/1
10 TABLET ORAL DAILY
COMMUNITY
Start: 2017-08-09

## 2023-05-15 RX ORDER — FENOFIBRATE 145 MG/1
1 TABLET, COATED ORAL DAILY
COMMUNITY
Start: 2018-07-16